# Patient Record
Sex: MALE | URBAN - METROPOLITAN AREA
[De-identification: names, ages, dates, MRNs, and addresses within clinical notes are randomized per-mention and may not be internally consistent; named-entity substitution may affect disease eponyms.]

---

## 2020-05-19 ENCOUNTER — OFFICE VISIT (OUTPATIENT)
Dept: URBAN - METROPOLITAN AREA CLINIC 62 | Facility: CLINIC | Age: 60
End: 2020-05-19
Payer: COMMERCIAL

## 2020-05-19 DIAGNOSIS — H25.13 AGE-RELATED NUCLEAR CATARACT, BILATERAL: ICD-10-CM

## 2020-05-19 DIAGNOSIS — H52.03 HYPERMETROPIA, BILATERAL: Primary | ICD-10-CM

## 2020-05-19 PROCEDURE — 92002 INTRM OPH EXAM NEW PATIENT: CPT | Performed by: OPTOMETRIST

## 2020-05-19 ASSESSMENT — KERATOMETRY
OS: 44.75
OD: 43.75

## 2020-05-19 ASSESSMENT — INTRAOCULAR PRESSURE
OS: 11
OD: 11

## 2020-05-19 ASSESSMENT — VISUAL ACUITY
OS: 20/30
OD: 20/20

## 2021-05-11 ENCOUNTER — OFFICE VISIT (OUTPATIENT)
Dept: URBAN - METROPOLITAN AREA CLINIC 63 | Facility: CLINIC | Age: 61
End: 2021-05-11

## 2021-05-11 DIAGNOSIS — D23.10 OTHER BENIGN NEOPLASM OF SKIN OF EYELID INCLUDING CANTHUS: ICD-10-CM

## 2021-05-11 DIAGNOSIS — H04.123 DRY EYE SYNDROME OF BILATERAL LACRIMAL GLANDS: ICD-10-CM

## 2021-05-11 PROCEDURE — 99212 OFFICE O/P EST SF 10 MIN: CPT | Performed by: OPTOMETRIST

## 2021-05-11 ASSESSMENT — INTRAOCULAR PRESSURE
OD: 10
OS: 11

## 2021-05-11 ASSESSMENT — KERATOMETRY
OS: 44.38
OD: 43.88

## 2021-05-11 ASSESSMENT — VISUAL ACUITY
OS: 20/40
OD: 20/25

## 2021-05-11 NOTE — IMPRESSION/PLAN
Impression: Other benign neoplasm of skin of eyelid including canthus: D23.10. Plan: Stable. Observe.

## 2024-01-30 ENCOUNTER — OFFICE VISIT (OUTPATIENT)
Dept: URBAN - METROPOLITAN AREA CLINIC 63 | Facility: CLINIC | Age: 64
End: 2024-01-30
Payer: MEDICARE

## 2024-01-30 DIAGNOSIS — H25.13 AGE-RELATED NUCLEAR CATARACT, BILATERAL: Primary | ICD-10-CM

## 2024-01-30 DIAGNOSIS — H52.03 HYPERMETROPIA, BILATERAL: ICD-10-CM

## 2024-01-30 DIAGNOSIS — H04.123 DRY EYE SYNDROME OF BILATERAL LACRIMAL GLANDS: ICD-10-CM

## 2024-01-30 DIAGNOSIS — D23.10 OTHER BENIGN NEOPLASM OF SKIN OF EYELID INCLUDING CANTHUS: ICD-10-CM

## 2024-01-30 PROCEDURE — 92014 COMPRE OPH EXAM EST PT 1/>: CPT | Performed by: OPTOMETRIST

## 2024-01-30 ASSESSMENT — INTRAOCULAR PRESSURE
OD: 15
OS: 16

## 2024-01-30 ASSESSMENT — VISUAL ACUITY
OD: 20/20
OS: 20/30

## 2024-01-30 ASSESSMENT — KERATOMETRY
OS: 44.25
OD: 44.25

## 2025-05-27 ENCOUNTER — OFFICE VISIT (OUTPATIENT)
Dept: URBAN - METROPOLITAN AREA CLINIC 63 | Facility: CLINIC | Age: 65
End: 2025-05-27
Payer: MEDICARE

## 2025-05-27 DIAGNOSIS — H43.813 VITREOUS DEGENERATION, BILATERAL: ICD-10-CM

## 2025-05-27 DIAGNOSIS — D23.10 OTHER BENIGN NEOPLASM OF SKIN OF EYELID INCLUDING CANTHUS: ICD-10-CM

## 2025-05-27 DIAGNOSIS — H25.13 AGE-RELATED NUCLEAR CATARACT, BILATERAL: ICD-10-CM

## 2025-05-27 DIAGNOSIS — H04.123 DRY EYE SYNDROME OF BILATERAL LACRIMAL GLANDS: Primary | ICD-10-CM

## 2025-05-27 PROCEDURE — 92134 CPTRZ OPH DX IMG PST SGM RTA: CPT | Performed by: OPTOMETRIST

## 2025-05-27 PROCEDURE — 92014 COMPRE OPH EXAM EST PT 1/>: CPT | Performed by: OPTOMETRIST

## 2025-05-27 ASSESSMENT — KERATOMETRY
OD: 44.13
OS: 44.63

## 2025-05-27 ASSESSMENT — VISUAL ACUITY
OD: 20/25
OS: 20/40

## 2025-05-27 ASSESSMENT — INTRAOCULAR PRESSURE
OD: 12
OS: 10